# Patient Record
Sex: MALE | Race: BLACK OR AFRICAN AMERICAN
[De-identification: names, ages, dates, MRNs, and addresses within clinical notes are randomized per-mention and may not be internally consistent; named-entity substitution may affect disease eponyms.]

---

## 2023-04-13 ENCOUNTER — APPOINTMENT (OUTPATIENT)
Dept: OPHTHALMOLOGY | Facility: CLINIC | Age: 53
End: 2023-04-13
Payer: COMMERCIAL

## 2023-04-13 ENCOUNTER — NON-APPOINTMENT (OUTPATIENT)
Age: 53
End: 2023-04-13

## 2023-04-13 PROCEDURE — 92004 COMPRE OPH EXAM NEW PT 1/>: CPT

## 2023-04-13 PROCEDURE — 92015 DETERMINE REFRACTIVE STATE: CPT

## 2023-05-03 ENCOUNTER — APPOINTMENT (OUTPATIENT)
Dept: OPHTHALMOLOGY | Facility: CLINIC | Age: 53
End: 2023-05-03

## 2023-06-13 ENCOUNTER — APPOINTMENT (OUTPATIENT)
Dept: OPHTHALMOLOGY | Facility: CLINIC | Age: 53
End: 2023-06-13
Payer: COMMERCIAL

## 2023-06-13 ENCOUNTER — NON-APPOINTMENT (OUTPATIENT)
Age: 53
End: 2023-06-13

## 2023-06-13 PROCEDURE — 92012 INTRM OPH EXAM EST PATIENT: CPT

## 2024-03-18 ENCOUNTER — APPOINTMENT (OUTPATIENT)
Dept: INTERNAL MEDICINE | Facility: CLINIC | Age: 54
End: 2024-03-18
Payer: COMMERCIAL

## 2024-03-18 VITALS
RESPIRATION RATE: 16 BRPM | HEART RATE: 79 BPM | WEIGHT: 195 LBS | HEIGHT: 67 IN | DIASTOLIC BLOOD PRESSURE: 80 MMHG | OXYGEN SATURATION: 99 % | SYSTOLIC BLOOD PRESSURE: 128 MMHG | BODY MASS INDEX: 30.61 KG/M2

## 2024-03-18 DIAGNOSIS — Z91.81 HISTORY OF FALLING: ICD-10-CM

## 2024-03-18 DIAGNOSIS — Z80.9 FAMILY HISTORY OF MALIGNANT NEOPLASM, UNSPECIFIED: ICD-10-CM

## 2024-03-18 DIAGNOSIS — Z00.00 ENCOUNTER FOR GENERAL ADULT MEDICAL EXAMINATION W/OUT ABNORMAL FINDINGS: ICD-10-CM

## 2024-03-18 DIAGNOSIS — Z82.49 FAMILY HISTORY OF ISCHEMIC HEART DISEASE AND OTHER DISEASES OF THE CIRCULATORY SYSTEM: ICD-10-CM

## 2024-03-18 DIAGNOSIS — H04.209 UNSPECIFIED EPIPHORA, UNSPECIFIED SIDE: ICD-10-CM

## 2024-03-18 PROCEDURE — 99386 PREV VISIT NEW AGE 40-64: CPT | Mod: 25

## 2024-03-18 RX ORDER — CHLORHEXIDINE GLUCONATE 4 %
LIQUID (ML) TOPICAL
Refills: 0 | Status: ACTIVE | COMMUNITY

## 2024-03-18 NOTE — HISTORY OF PRESENT ILLNESS
[FreeTextEntry1] : check up. [de-identified] : 54-year-old male is here for checkup.  This is my first time seeing this patient.  Patient denies any fevers, chills, nausea, vomiting, diarrhea, constipation, chest pain, shortness of breath, weakness, numbness, tingling at this time.  Patient reports that he has had 3 COVID vaccines in the past.  Alcohol: Endorses. Patient reports that they drink alcohol occasionally (beer 2 times a week).  Smoking: Denies. Patient reports that they never smoked cigarettes.  Illicit Drugs: Denies. Patient reports that they do not use any recreational drugs.  Colonoscopy: Patient reports that their last colonoscopy was in 2021.  They report that the colonoscopy was normal.  They report that a repeat colonoscopy is due in 10 year in 2031 (Self Reported) Diet: Patient reports that they are good with diet  Exercise: Patient reports that they are good with exercise Living Situation: Family.  Patient reports that they live with wife. Employment Status: Employed. Patient reports that they are employed at this point as a . Education: Reports that the highest level of education is High school. Relationship Status: .  Number of kids : 6 Kids (3 from current wife)  ADLs: Fully functional (bathing, dressing, toileting, transferring, walking, feeding).  Patient reports that they can perform ADLs IADLs: Fully functional and needs no help or supervision to perform IADLs (using the telephone, shopping, preparing meals, housekeeping, doing laundry, using transportation, managing medications and managing finances).  Patient reports that they can perform IADLs.

## 2024-03-18 NOTE — ASSESSMENT
[FreeTextEntry1] : We will do routine blood work in the form of CBC, CMP, A1c, lipid, TSH, B12 folate at this point. he may follow-up earlier if needed Pt was told to call with problems or concerns. The patient was told to seek immediate attention by calling 911 or going to the ER if his condition does not improve or gets acutely worse. He wishes to be screened for STDs. Will check for HIV, HSV, syphilis, gonorrhea, chlamydia, trichomonas, hepatitis at this point.

## 2024-03-18 NOTE — PHYSICAL EXAM
[Normal] : normal rate, regular rhythm, normal S1 and S2 and no murmur heard [No Varicosities] : no varicosities [Pedal Pulses Present] : the pedal pulses are present [No Edema] : there was no peripheral edema [No Extremity Clubbing/Cyanosis] : no extremity clubbing/cyanosis [Soft] : abdomen soft [Non Tender] : non-tender [Non-distended] : non-distended [Normal Bowel Sounds] : normal bowel sounds [Coordination Grossly Intact] : coordination grossly intact [No Focal Deficits] : no focal deficits [de-identified] : 2 cm healing scab noted on the right temporal region

## 2024-03-19 DIAGNOSIS — R71.8 OTHER ABNORMALITY OF RED BLOOD CELLS: ICD-10-CM

## 2024-03-19 DIAGNOSIS — R74.8 ABNORMAL LEVELS OF OTHER SERUM ENZYMES: ICD-10-CM

## 2024-03-19 DIAGNOSIS — D72.9 DISORDER OF WHITE BLOOD CELLS, UNSPECIFIED: ICD-10-CM

## 2024-03-19 LAB
25(OH)D3 SERPL-MCNC: 27.6 NG/ML
ALBUMIN SERPL ELPH-MCNC: 4.6 G/DL
ALP BLD-CCNC: 85 U/L
ALT SERPL-CCNC: 88 U/L
ANION GAP SERPL CALC-SCNC: 13 MMOL/L
AST SERPL-CCNC: 81 U/L
BASOPHILS # BLD AUTO: 0.02 K/UL
BASOPHILS NFR BLD AUTO: 0.5 %
BILIRUB SERPL-MCNC: 0.5 MG/DL
BUN SERPL-MCNC: 12 MG/DL
CALCIUM SERPL-MCNC: 9.9 MG/DL
CHLORIDE SERPL-SCNC: 104 MMOL/L
CHOLEST SERPL-MCNC: 151 MG/DL
CO2 SERPL-SCNC: 25 MMOL/L
CREAT SERPL-MCNC: 0.96 MG/DL
EGFR: 94 ML/MIN/1.73M2
EOSINOPHIL # BLD AUTO: 0.08 K/UL
EOSINOPHIL NFR BLD AUTO: 2 %
ESTIMATED AVERAGE GLUCOSE: 120 MG/DL
FOLATE SERPL-MCNC: 11.2 NG/ML
GLUCOSE SERPL-MCNC: 107 MG/DL
HBA1C MFR BLD HPLC: 5.8 %
HCT VFR BLD CALC: 47.7 %
HDLC SERPL-MCNC: 62 MG/DL
HGB BLD-MCNC: 15.4 G/DL
HIV1+2 AB SPEC QL IA.RAPID: NONREACTIVE
IMM GRANULOCYTES NFR BLD AUTO: 0.2 %
LDLC SERPL CALC-MCNC: 77 MG/DL
LYMPHOCYTES # BLD AUTO: 0.96 K/UL
LYMPHOCYTES NFR BLD AUTO: 23.5 %
MAN DIFF?: NORMAL
MCHC RBC-ENTMCNC: 32.3 GM/DL
MCHC RBC-ENTMCNC: 33 PG
MCV RBC AUTO: 102.1 FL
MONOCYTES # BLD AUTO: 0.46 K/UL
MONOCYTES NFR BLD AUTO: 11.2 %
NEUTROPHILS # BLD AUTO: 2.56 K/UL
NEUTROPHILS NFR BLD AUTO: 62.6 %
NONHDLC SERPL-MCNC: 89 MG/DL
PLATELET # BLD AUTO: 190 K/UL
POTASSIUM SERPL-SCNC: 4.6 MMOL/L
PROT SERPL-MCNC: 7.5 G/DL
PSA FREE FLD-MCNC: 28 %
PSA FREE SERPL-MCNC: 0.13 NG/ML
PSA SERPL-MCNC: 0.46 NG/ML
RBC # BLD: 4.67 M/UL
RBC # FLD: 13.1 %
SODIUM SERPL-SCNC: 141 MMOL/L
TRIGL SERPL-MCNC: 61 MG/DL
TSH SERPL-ACNC: 2.31 UIU/ML
VIT B12 SERPL-MCNC: 967 PG/ML
WBC # FLD AUTO: 4.09 K/UL

## 2024-03-20 LAB
C TRACH RRNA SPEC QL NAA+PROBE: NOT DETECTED
HAV IGM SER QL: NONREACTIVE
HBV CORE IGM SER QL: NONREACTIVE
HBV SURFACE AG SER QL: NONREACTIVE
HCV AB SER QL: NONREACTIVE
HCV S/CO RATIO: 0.1 S/CO
N GONORRHOEA RRNA SPEC QL NAA+PROBE: NOT DETECTED
SOURCE AMPLIFICATION: NORMAL
SOURCE AMPLIFICATION: NORMAL
T PALLIDUM AB SER QL IA: NEGATIVE

## 2024-03-21 ENCOUNTER — NON-APPOINTMENT (OUTPATIENT)
Age: 54
End: 2024-03-21

## 2024-03-21 LAB
HSV 1 IGG TYPE-SPECIFIC AB: 36.5 INDEX
HSV 2 IGG TYPE-SPECIFIC AB: <0.9 INDEX
SOURCE AMPLIFICATION: NORMAL
T VAGINALIS RRNA SPEC QL NAA+PROBE: NOT DETECTED

## 2024-03-23 ENCOUNTER — OUTPATIENT (OUTPATIENT)
Dept: OUTPATIENT SERVICES | Facility: HOSPITAL | Age: 54
LOS: 1 days | End: 2024-03-23
Payer: COMMERCIAL

## 2024-03-23 ENCOUNTER — APPOINTMENT (OUTPATIENT)
Dept: CT IMAGING | Facility: IMAGING CENTER | Age: 54
End: 2024-03-23
Payer: COMMERCIAL

## 2024-03-23 DIAGNOSIS — Z91.81 HISTORY OF FALLING: ICD-10-CM

## 2024-03-23 PROCEDURE — 70450 CT HEAD/BRAIN W/O DYE: CPT

## 2024-03-23 PROCEDURE — 70450 CT HEAD/BRAIN W/O DYE: CPT | Mod: 26

## 2024-05-15 ENCOUNTER — OUTPATIENT (OUTPATIENT)
Dept: OUTPATIENT SERVICES | Facility: HOSPITAL | Age: 54
LOS: 1 days | Discharge: HOME | End: 2024-05-15

## 2024-05-15 DIAGNOSIS — D72.119 HYPEREOSINOPHILIC SYNDROME [HES], UNSPECIFIED: ICD-10-CM

## 2024-05-21 ENCOUNTER — RESULT REVIEW (OUTPATIENT)
Age: 54
End: 2024-05-21

## 2024-05-21 ENCOUNTER — APPOINTMENT (OUTPATIENT)
Dept: HEMATOLOGY ONCOLOGY | Facility: CLINIC | Age: 54
End: 2024-05-21
Payer: COMMERCIAL

## 2024-05-21 VITALS
OXYGEN SATURATION: 99 % | RESPIRATION RATE: 17 BRPM | BODY MASS INDEX: 29.93 KG/M2 | WEIGHT: 190.7 LBS | HEART RATE: 83 BPM | SYSTOLIC BLOOD PRESSURE: 129 MMHG | TEMPERATURE: 98.5 F | DIASTOLIC BLOOD PRESSURE: 89 MMHG | HEIGHT: 66.93 IN

## 2024-05-21 DIAGNOSIS — Z78.9 OTHER SPECIFIED HEALTH STATUS: ICD-10-CM

## 2024-05-21 LAB
BASOPHILS # BLD AUTO: 0.01 K/UL — SIGNIFICANT CHANGE UP (ref 0–0.2)
BASOPHILS NFR BLD AUTO: 0.2 % — SIGNIFICANT CHANGE UP (ref 0–2)
EOSINOPHIL # BLD AUTO: 0.02 K/UL — SIGNIFICANT CHANGE UP (ref 0–0.5)
EOSINOPHIL NFR BLD AUTO: 0.4 % — SIGNIFICANT CHANGE UP (ref 0–6)
ERYTHROCYTE [SEDIMENTATION RATE] IN BLOOD: 4 MM/HR — SIGNIFICANT CHANGE UP (ref 0–20)
HCT VFR BLD CALC: 46.4 % — SIGNIFICANT CHANGE UP (ref 39–50)
HGB BLD-MCNC: 15.6 G/DL — SIGNIFICANT CHANGE UP (ref 13–17)
IMM GRANULOCYTES NFR BLD AUTO: 0.2 % — SIGNIFICANT CHANGE UP (ref 0–0.9)
LYMPHOCYTES # BLD AUTO: 0.86 K/UL — LOW (ref 1–3.3)
LYMPHOCYTES # BLD AUTO: 16.6 % — SIGNIFICANT CHANGE UP (ref 13–44)
MCHC RBC-ENTMCNC: 33.4 PG — SIGNIFICANT CHANGE UP (ref 27–34)
MCHC RBC-ENTMCNC: 33.6 G/DL — SIGNIFICANT CHANGE UP (ref 32–36)
MCV RBC AUTO: 99.4 FL — SIGNIFICANT CHANGE UP (ref 80–100)
MONOCYTES # BLD AUTO: 0.49 K/UL — SIGNIFICANT CHANGE UP (ref 0–0.9)
MONOCYTES NFR BLD AUTO: 9.5 % — SIGNIFICANT CHANGE UP (ref 2–14)
NEUTROPHILS # BLD AUTO: 3.78 K/UL — SIGNIFICANT CHANGE UP (ref 1.8–7.4)
NEUTROPHILS NFR BLD AUTO: 73.1 % — SIGNIFICANT CHANGE UP (ref 43–77)
NRBC # BLD: 0 /100 WBCS — SIGNIFICANT CHANGE UP (ref 0–0)
PLATELET # BLD AUTO: 178 K/UL — SIGNIFICANT CHANGE UP (ref 150–400)
RBC # BLD: 4.67 M/UL — SIGNIFICANT CHANGE UP (ref 4.2–5.8)
RBC # FLD: 12.5 % — SIGNIFICANT CHANGE UP (ref 10.3–14.5)
RETICS #: 63.5 K/UL — SIGNIFICANT CHANGE UP (ref 25–125)
RETICS/RBC NFR: 1.4 % — SIGNIFICANT CHANGE UP (ref 0.5–2.5)
WBC # BLD: 5.17 K/UL — SIGNIFICANT CHANGE UP (ref 3.8–10.5)
WBC # FLD AUTO: 5.17 K/UL — SIGNIFICANT CHANGE UP (ref 3.8–10.5)

## 2024-05-21 PROCEDURE — 99204 OFFICE O/P NEW MOD 45 MIN: CPT

## 2024-05-29 ENCOUNTER — NON-APPOINTMENT (OUTPATIENT)
Age: 54
End: 2024-05-29

## 2024-05-29 LAB
ALBUMIN MFR SERPL ELPH: 55 %
ALBUMIN SERPL ELPH-MCNC: 4.9 G/DL
ALBUMIN SERPL-MCNC: 4.3 G/DL
ALBUMIN/GLOB SERPL: 1.2 RATIO
ALP BLD-CCNC: 78 U/L
ALPHA1 GLOB MFR SERPL ELPH: 3.7 %
ALPHA1 GLOB SERPL ELPH-MCNC: 0.3 G/DL
ALPHA2 GLOB MFR SERPL ELPH: 7.8 %
ALPHA2 GLOB SERPL ELPH-MCNC: 0.6 G/DL
ALT SERPL-CCNC: 51 U/L
ANION GAP SERPL CALC-SCNC: 15 MMOL/L
AST SERPL-CCNC: 57 U/L
B-GLOBULIN MFR SERPL ELPH: 12.9 %
B-GLOBULIN SERPL ELPH-MCNC: 1 G/DL
BILIRUB SERPL-MCNC: 0.7 MG/DL
BUN SERPL-MCNC: 13 MG/DL
CALCIUM SERPL-MCNC: 9.8 MG/DL
CHLORIDE SERPL-SCNC: 101 MMOL/L
CO2 SERPL-SCNC: 24 MMOL/L
COPPER SERPL-MCNC: 121 UG/DL
CREAT SERPL-MCNC: 1.04 MG/DL
CRP SERPL-MCNC: 3 MG/L
DEPRECATED KAPPA LC FREE/LAMBDA SER: 1.1 RATIO
EGFR: 85 ML/MIN/1.73M2
FOLATE SERPL-MCNC: 11.4 NG/ML
GAMMA GLOB FLD ELPH-MCNC: 1.6 G/DL
GAMMA GLOB MFR SERPL ELPH: 20.6 %
GLUCOSE SERPL-MCNC: 85 MG/DL
HAPTOGLOB SERPL-MCNC: 148 MG/DL
IGA SER QL IEP: 283 MG/DL
IGG SER QL IEP: 1405 MG/DL
IGM SER QL IEP: 139 MG/DL
INTERPRETATION SERPL IEP-IMP: NORMAL
KAPPA LC CSF-MCNC: 2.72 MG/DL
KAPPA LC SERPL-MCNC: 2.98 MG/DL
LDH SERPL-CCNC: 181 U/L
M PROTEIN SPEC IFE-MCNC: NORMAL
POTASSIUM SERPL-SCNC: 4.8 MMOL/L
PROT SERPL-MCNC: 7.7 G/DL
PROT SERPL-MCNC: 7.8 G/DL
PROT SERPL-MCNC: 7.8 G/DL
SODIUM SERPL-SCNC: 139 MMOL/L
VIT B12 SERPL-MCNC: 751 PG/ML

## 2024-05-30 ENCOUNTER — APPOINTMENT (OUTPATIENT)
Dept: HEMATOLOGY ONCOLOGY | Facility: CLINIC | Age: 54
End: 2024-05-30
Payer: COMMERCIAL

## 2024-05-30 DIAGNOSIS — D75.89 OTHER SPECIFIED DISEASES OF BLOOD AND BLOOD-FORMING ORGANS: ICD-10-CM

## 2024-05-30 DIAGNOSIS — D72.810 LYMPHOCYTOPENIA: ICD-10-CM

## 2024-05-30 PROCEDURE — 99442: CPT | Mod: 93

## 2024-06-08 PROBLEM — Z78.9 SOCIAL ALCOHOL USE: Status: ACTIVE | Noted: 2024-06-08

## 2024-06-08 RX ORDER — CHOLECALCIFEROL (VITAMIN D3) 25 MCG
TABLET ORAL
Refills: 0 | Status: ACTIVE | COMMUNITY

## 2024-06-08 NOTE — REASON FOR VISIT
[Initial Consultation] : an initial consultation for [FreeTextEntry2] : macrocytosis, lymphocytopenia

## 2024-06-08 NOTE — REVIEW OF SYSTEMS
[Recent Change In Weight] : ~T recent weight change [Diarrhea: Grade 0] : Diarrhea: Grade 0 [Negative] : Allergic/Immunologic [Palpitations] : palpitations [FreeTextEntry4] : occasional gum bleeding  [FreeTextEntry5] : occasional palpitations

## 2024-06-08 NOTE — PHYSICAL EXAM
[Normal] : normal appearance, no rash, nodules, vesicles, ulcers, erythema [de-identified] : No cervical, axillary or inguinal LAD noted

## 2024-06-08 NOTE — ASSESSMENT
[FreeTextEntry1] : 54-year-old man with no significant past medical history here for evaluation of macrocytosis and lymphocytopenia.    Recent CBC on 3/18/2024 showed WBC 4.09, HGB 15.4, HCT 47.7, .1 and ,000.  Differential showed normal % lymphocytes at 23.5 with ALC of 0.96.  Recent B12 967 and Folate 11.2.  Hepatitis B/C and HIV are all non-reactive.  TSH was 2.31.  Patient reports that he was vacationing in [place] for two weeks in 3/2024 and had increased alcohol consumption at that time.    Plan: Will check CBC, CMP, B12, Folate, Copper level, plasma cell screen, LDH, haptoglobin, reticulocyte count, ESR/CRP and flow cytometry.  Patient advised to follow-up with telehealth visit in two weeks to discuss all laboratory results.

## 2024-06-08 NOTE — HISTORY OF PRESENT ILLNESS
[de-identified] : Initial Consultation on 2024 Referred by: Dr. Ramirez Navarrete CC: macrocytosis, lymphocytopenia   HPI: 54-year-old man with no significant past medical history here for evaluation of macrocytosis and lymphocytopenia.    Recent CBC on 3/18/2024 showed WBC 4.09, HGB 15.4, HCT 47.7, .1 and ,000.  Differential showed normal % lymphocytes at 23.5 with ALC of 0.96.  Recent B12 967 and Folate 11.2.  Hepatitis B/C and HIV are all non-reactive.  TSH was 2.31.  Patient reports that he was vacationing in [place] for two weeks in 3/2024 prior to the lab draw and had increased alcohol consumption at that time.     He feels good overall.  He reports a 4-pound deliberate weight loss.  He c/o occasional palpitations.  He c/o occasional gum bleeding; he plans to see his dentist soon for evaluation. Patient denies any fever/chills, recent infections, CP, SOB, abdominal pain, n/v/d, bloody/black stools, frequent headaches, dizziness, night sweats, swollen glands, rashes or joint pain/swelling.    Hospitalizations: Denies   Medications: See List.  Medications reconciled Also takes Vitamin D3 intermittently and Multivitamin daily.    Allergies: NKDA   Social History: . Has three children.  Works as a retired .  Works as an  for cargo/shipping.  Denies tobacco smoking.  Occasional marijuana use.  Alcohol use- 6 pack of beer and 2-3 shots weekly.  [place] and father was born in [place].  Eats a regular diet without restrictions.    Family History: Mother , unknown cancer at age 59   Healthcare Maintenance: Primary care doctor- Dr. Navarrete- last seen two months ago.  Last colonoscopy- 3 years ago, normal Denies endoscopy.   [de-identified] : Initial Visit

## 2024-06-09 PROBLEM — D72.810 ACQUIRED LYMPHOCYTOPENIA: Status: ACTIVE | Noted: 2024-05-21

## 2024-06-09 PROBLEM — D75.89 MACROCYTOSIS: Status: ACTIVE | Noted: 2024-05-21

## 2024-06-09 NOTE — HISTORY OF PRESENT ILLNESS
[de-identified] : Initial Consultation on 2024 Referred by: Dr. Ramirez Navarrete CC: macrocytosis, lymphocytopenia   HPI: 54-year-old man with no significant past medical history here for evaluation of macrocytosis and lymphocytopenia.    Recent CBC on 3/18/2024 showed WBC 4.09, HGB 15.4, HCT 47.7, .1 and ,000.  Differential showed normal % lymphocytes at 23.5 with ALC of 0.96.  Recent B12 967 and Folate 11.2.  Hepatitis B/C and HIV are all non-reactive.  TSH was 2.31.  Patient reports that he was vacationing in [place] for two weeks in 3/2024 prior to the lab draw and had increased alcohol consumption at that time.     He feels good overall.  He reports a 4-pound deliberate weight loss.  He c/o occasional palpitations.  He c/o occasional gum bleeding; he plans to see his dentist soon for evaluation. Patient denies any fever/chills, recent infections, CP, SOB, abdominal pain, n/v/d, bloody/black stools, frequent headaches, dizziness, night sweats, swollen glands, rashes or joint pain/swelling.    Hospitalizations: Denies   Medications: See List.  Medications reconciled Also takes Vitamin D3 intermittently and Multivitamin daily.    Allergies: NKDA   Social History: . Has three children.  Works as a retired .  Works as an  for cargo/shipping.  Denies tobacco smoking.  Occasional marijuana use.  Alcohol use- 6 pack of beer and 2-3 shots weekly.  [place] and father was born in [place].  Eats a regular diet without restrictions.    Family History: Mother , unknown cancer at age 59   Healthcare Maintenance: Primary care doctor- Dr. Navarrete- last seen two months ago.  Last colonoscopy- 3 years ago, normal Denies endoscopy.   [de-identified] : Follow-up Visit by telephone on 5/30/2024 to discuss laboratory findings.  Patient seen for macrocytosis and lymphocytopenia.  CBC showed WBC 5.17, HGB 15.6, HCT 46.4, MCV 99.4, ,000.  Lymphocyte % normal at 16.6 with decreased ALC of 0.86.  CMP showed AST 57, ALT 51, otherwise normal.  B12 was 751, Folate 11.4 Copper level 121 normal Serum protein electrophoresis showed no monoclonal band.  No evidence of hemolysis ESR 4 CRP 3 Flow cytometry normal Kappa/Lambda FLC ratio 1.10  20.9

## 2024-06-09 NOTE — ASSESSMENT
[FreeTextEntry1] : 54-year-old man with no significant past medical history here for evaluation of macrocytosis and lymphocytopenia.    Macrocytosis resolved. Work-up normal.  ALC still decreased without known etiology.   Plan: Recommend follow-up in 6 months to repeat CBC with differential.  Follow-up with PMD for transaminitis.  Tasked Pamela to schedule an appt.  All questions answered.

## 2024-06-09 NOTE — REVIEW OF SYSTEMS
[Recent Change In Weight] : ~T recent weight change [Palpitations] : palpitations [Diarrhea: Grade 0] : Diarrhea: Grade 0 [Negative] : Allergic/Immunologic [FreeTextEntry4] : occasional gum bleeding  [FreeTextEntry5] : occasional palpitations

## 2024-06-13 ENCOUNTER — APPOINTMENT (OUTPATIENT)
Dept: INTERNAL MEDICINE | Facility: CLINIC | Age: 54
End: 2024-06-13

## 2024-09-23 ENCOUNTER — APPOINTMENT (OUTPATIENT)
Dept: OPHTHALMOLOGY | Facility: CLINIC | Age: 54
End: 2024-09-23
Payer: COMMERCIAL

## 2024-09-23 ENCOUNTER — NON-APPOINTMENT (OUTPATIENT)
Age: 54
End: 2024-09-23

## 2024-09-23 PROCEDURE — 92012 INTRM OPH EXAM EST PATIENT: CPT

## 2024-11-14 ENCOUNTER — OUTPATIENT (OUTPATIENT)
Dept: OUTPATIENT SERVICES | Facility: HOSPITAL | Age: 54
LOS: 1 days | Discharge: HOME | End: 2024-11-14

## 2024-11-14 DIAGNOSIS — D72.119 HYPEREOSINOPHILIC SYNDROME [HES], UNSPECIFIED: ICD-10-CM

## 2024-11-20 ENCOUNTER — APPOINTMENT (OUTPATIENT)
Dept: HEMATOLOGY ONCOLOGY | Facility: CLINIC | Age: 54
End: 2024-11-20

## 2024-11-20 ENCOUNTER — RESULT REVIEW (OUTPATIENT)
Age: 54
End: 2024-11-20

## 2024-11-20 VITALS
BODY MASS INDEX: 31.19 KG/M2 | OXYGEN SATURATION: 100 % | HEART RATE: 83 BPM | WEIGHT: 198.72 LBS | TEMPERATURE: 98.4 F | RESPIRATION RATE: 17 BRPM | DIASTOLIC BLOOD PRESSURE: 95 MMHG | SYSTOLIC BLOOD PRESSURE: 145 MMHG

## 2024-11-20 LAB
ALBUMIN SERPL ELPH-MCNC: 4.5 G/DL
ALP BLD-CCNC: 82 U/L
ALT SERPL-CCNC: 47 U/L
ANION GAP SERPL CALC-SCNC: 12 MMOL/L
AST SERPL-CCNC: 47 U/L
BASOPHILS # BLD AUTO: 0.01 K/UL — SIGNIFICANT CHANGE UP (ref 0–0.2)
BASOPHILS NFR BLD AUTO: 0.2 % — SIGNIFICANT CHANGE UP (ref 0–2)
BILIRUB SERPL-MCNC: 0.6 MG/DL
BUN SERPL-MCNC: 12 MG/DL
CALCIUM SERPL-MCNC: 10.5 MG/DL
CD16+CD56+ CELLS # BLD: 127 CELLS/UL
CD16+CD56+ CELLS NFR BLD: 12 %
CD19 CELLS NFR BLD: 28 CELLS/UL
CD3 CELLS # BLD: 892 CELLS/UL
CD3 CELLS NFR BLD: 83 %
CD3+CD4+ CELLS # BLD: 625 CELLS/UL
CD3+CD4+ CELLS NFR BLD: 57 %
CD3+CD4+ CELLS/CD3+CD8+ CLL SPEC: 2.26 RATIO
CD3+CD8+ CELLS # SPEC: 277 CELLS/UL
CD3+CD8+ CELLS NFR BLD: 25 %
CELLS.CD3-CD19+/CELLS IN BLOOD: 3 %
CHLORIDE SERPL-SCNC: 99 MMOL/L
CO2 SERPL-SCNC: 25 MMOL/L
CREAT SERPL-MCNC: 1.05 MG/DL
DEPRECATED KAPPA LC FREE/LAMBDA SER: 1.2 RATIO
EGFR: 84 ML/MIN/1.73M2
EOSINOPHIL # BLD AUTO: 0.11 K/UL — SIGNIFICANT CHANGE UP (ref 0–0.5)
EOSINOPHIL NFR BLD AUTO: 2.1 % — SIGNIFICANT CHANGE UP (ref 0–6)
FOLATE SERPL-MCNC: 11 NG/ML
GLUCOSE SERPL-MCNC: 106 MG/DL
HCT VFR BLD CALC: 46.4 % — SIGNIFICANT CHANGE UP (ref 39–50)
HCYS SERPL-MCNC: 21.1 UMOL/L
HGB BLD-MCNC: 15.6 G/DL — SIGNIFICANT CHANGE UP (ref 13–17)
IGA SER QL IEP: 279 MG/DL
IGG SER QL IEP: 1428 MG/DL
IGM SER QL IEP: 145 MG/DL
IMM GRANULOCYTES NFR BLD AUTO: 0.4 % — SIGNIFICANT CHANGE UP (ref 0–0.9)
KAPPA LC CSF-MCNC: 2.65 MG/DL
KAPPA LC SERPL-MCNC: 3.17 MG/DL
LYMPHOCYTES # BLD AUTO: 1.08 K/UL — SIGNIFICANT CHANGE UP (ref 1–3.3)
LYMPHOCYTES # BLD AUTO: 20.8 % — SIGNIFICANT CHANGE UP (ref 13–44)
MCHC RBC-ENTMCNC: 33.6 G/DL — SIGNIFICANT CHANGE UP (ref 32–36)
MCHC RBC-ENTMCNC: 33.9 PG — SIGNIFICANT CHANGE UP (ref 27–34)
MCV RBC AUTO: 100.9 FL — HIGH (ref 80–100)
MONOCYTES # BLD AUTO: 0.53 K/UL — SIGNIFICANT CHANGE UP (ref 0–0.9)
MONOCYTES NFR BLD AUTO: 10.2 % — SIGNIFICANT CHANGE UP (ref 2–14)
NEUTROPHILS # BLD AUTO: 3.44 K/UL — SIGNIFICANT CHANGE UP (ref 1.8–7.4)
NEUTROPHILS NFR BLD AUTO: 66.3 % — SIGNIFICANT CHANGE UP (ref 43–77)
NRBC # BLD: 0 /100 WBCS — SIGNIFICANT CHANGE UP (ref 0–0)
NRBC BLD-RTO: 0 /100 WBCS — SIGNIFICANT CHANGE UP (ref 0–0)
PLATELET # BLD AUTO: 182 K/UL — SIGNIFICANT CHANGE UP (ref 150–400)
POTASSIUM SERPL-SCNC: 4.8 MMOL/L
PROT SERPL-MCNC: 7.7 G/DL
RBC # BLD: 4.6 M/UL — SIGNIFICANT CHANGE UP (ref 4.2–5.8)
RBC # FLD: 12.5 % — SIGNIFICANT CHANGE UP (ref 10.3–14.5)
SODIUM SERPL-SCNC: 136 MMOL/L
VIT B12 SERPL-MCNC: 846 PG/ML
WBC # BLD: 5.19 K/UL — SIGNIFICANT CHANGE UP (ref 3.8–10.5)
WBC # FLD AUTO: 5.19 K/UL — SIGNIFICANT CHANGE UP (ref 3.8–10.5)

## 2024-11-20 PROCEDURE — 99213 OFFICE O/P EST LOW 20 MIN: CPT

## 2024-11-25 LAB — METHYLMALONATE SERPL-SCNC: 110 NMOL/L

## 2024-12-10 ENCOUNTER — NON-APPOINTMENT (OUTPATIENT)
Age: 54
End: 2024-12-10

## 2025-01-24 ENCOUNTER — APPOINTMENT (OUTPATIENT)
Dept: INTERNAL MEDICINE | Facility: CLINIC | Age: 55
End: 2025-01-24
Payer: COMMERCIAL

## 2025-01-24 VITALS
TEMPERATURE: 97.6 F | DIASTOLIC BLOOD PRESSURE: 78 MMHG | HEART RATE: 75 BPM | RESPIRATION RATE: 16 BRPM | SYSTOLIC BLOOD PRESSURE: 122 MMHG | BODY MASS INDEX: 30.45 KG/M2 | WEIGHT: 194 LBS | OXYGEN SATURATION: 98 % | HEIGHT: 66.93 IN

## 2025-01-24 VITALS — SYSTOLIC BLOOD PRESSURE: 118 MMHG | DIASTOLIC BLOOD PRESSURE: 82 MMHG

## 2025-01-24 DIAGNOSIS — R71.8 OTHER ABNORMALITY OF RED BLOOD CELLS: ICD-10-CM

## 2025-01-24 DIAGNOSIS — H04.209 UNSPECIFIED EPIPHORA, UNSPECIFIED SIDE: ICD-10-CM

## 2025-01-24 DIAGNOSIS — Z91.81 HISTORY OF FALLING: ICD-10-CM

## 2025-01-24 DIAGNOSIS — Z00.00 ENCOUNTER FOR GENERAL ADULT MEDICAL EXAMINATION W/OUT ABNORMAL FINDINGS: ICD-10-CM

## 2025-01-24 DIAGNOSIS — R74.8 ABNORMAL LEVELS OF OTHER SERUM ENZYMES: ICD-10-CM

## 2025-01-24 DIAGNOSIS — R73.03 PREDIABETES.: ICD-10-CM

## 2025-01-24 DIAGNOSIS — B00.9 HERPESVIRAL INFECTION, UNSPECIFIED: ICD-10-CM

## 2025-01-24 DIAGNOSIS — D72.9 DISORDER OF WHITE BLOOD CELLS, UNSPECIFIED: ICD-10-CM

## 2025-01-24 DIAGNOSIS — R79.89 OTHER SPECIFIED ABNORMAL FINDINGS OF BLOOD CHEMISTRY: ICD-10-CM

## 2025-01-24 PROCEDURE — 99396 PREV VISIT EST AGE 40-64: CPT

## 2025-01-27 LAB
25(OH)D3 SERPL-MCNC: 25 NG/ML
ALBUMIN SERPL ELPH-MCNC: 4.3 G/DL
ALP BLD-CCNC: 70 U/L
ALT SERPL-CCNC: 46 U/L
ANION GAP SERPL CALC-SCNC: 12 MMOL/L
AST SERPL-CCNC: 38 U/L
BASOPHILS # BLD AUTO: 0.01 K/UL
BASOPHILS NFR BLD AUTO: 0.3 %
BILIRUB SERPL-MCNC: 0.6 MG/DL
BUN SERPL-MCNC: 7 MG/DL
CALCIUM SERPL-MCNC: 9.3 MG/DL
CHLORIDE SERPL-SCNC: 102 MMOL/L
CHOLEST SERPL-MCNC: 149 MG/DL
CO2 SERPL-SCNC: 24 MMOL/L
CREAT SERPL-MCNC: 0.97 MG/DL
EGFR: 93 ML/MIN/1.73M2
EOSINOPHIL # BLD AUTO: 0.1 K/UL
EOSINOPHIL NFR BLD AUTO: 2.6 %
ESTIMATED AVERAGE GLUCOSE: 120 MG/DL
FOLATE SERPL-MCNC: 8.3 NG/ML
GLUCOSE SERPL-MCNC: 94 MG/DL
HAV IGM SER QL: NONREACTIVE
HBA1C MFR BLD HPLC: 5.8 %
HBV CORE IGM SER QL: NONREACTIVE
HBV SURFACE AG SER QL: NONREACTIVE
HCT VFR BLD CALC: 44.5 %
HCV AB SER QL: NONREACTIVE
HCV S/CO RATIO: 0.1 S/CO
HDLC SERPL-MCNC: 53 MG/DL
HGB BLD-MCNC: 14.6 G/DL
IMM GRANULOCYTES NFR BLD AUTO: 0.5 %
LDLC SERPL CALC-MCNC: 79 MG/DL
LYMPHOCYTES # BLD AUTO: 0.82 K/UL
LYMPHOCYTES NFR BLD AUTO: 21.2 %
MAN DIFF?: NORMAL
MCHC RBC-ENTMCNC: 32.8 G/DL
MCHC RBC-ENTMCNC: 33.3 PG
MCV RBC AUTO: 101.6 FL
MONOCYTES # BLD AUTO: 0.58 K/UL
MONOCYTES NFR BLD AUTO: 15 %
NEUTROPHILS # BLD AUTO: 2.33 K/UL
NEUTROPHILS NFR BLD AUTO: 60.4 %
NONHDLC SERPL-MCNC: 96 MG/DL
PLATELET # BLD AUTO: 203 K/UL
POTASSIUM SERPL-SCNC: 3.9 MMOL/L
PROT SERPL-MCNC: 7 G/DL
PSA FREE FLD-MCNC: 25 %
PSA FREE SERPL-MCNC: 0.15 NG/ML
PSA SERPL-MCNC: 0.62 NG/ML
RBC # BLD: 4.38 M/UL
RBC # FLD: 12.6 %
SODIUM SERPL-SCNC: 139 MMOL/L
TRIGL SERPL-MCNC: 88 MG/DL
TSH SERPL-ACNC: 3.42 UIU/ML
VIT B12 SERPL-MCNC: 1018 PG/ML
WBC # FLD AUTO: 3.86 K/UL

## 2025-02-16 ENCOUNTER — OUTPATIENT (OUTPATIENT)
Dept: OUTPATIENT SERVICES | Facility: HOSPITAL | Age: 55
LOS: 1 days | End: 2025-02-16
Payer: COMMERCIAL

## 2025-02-16 DIAGNOSIS — R74.8 ABNORMAL LEVELS OF OTHER SERUM ENZYMES: ICD-10-CM

## 2025-02-16 DIAGNOSIS — Z00.00 ENCOUNTER FOR GENERAL ADULT MEDICAL EXAMINATION WITHOUT ABNORMAL FINDINGS: ICD-10-CM

## 2025-02-16 PROCEDURE — 76705 ECHO EXAM OF ABDOMEN: CPT

## 2025-02-19 DIAGNOSIS — K76.0 FATTY (CHANGE OF) LIVER, NOT ELSEWHERE CLASSIFIED: ICD-10-CM

## 2025-04-07 ENCOUNTER — APPOINTMENT (OUTPATIENT)
Dept: INTERNAL MEDICINE | Facility: CLINIC | Age: 55
End: 2025-04-07
Payer: COMMERCIAL

## 2025-04-07 VITALS
WEIGHT: 188 LBS | HEIGHT: 66.93 IN | HEART RATE: 78 BPM | DIASTOLIC BLOOD PRESSURE: 78 MMHG | BODY MASS INDEX: 29.51 KG/M2 | RESPIRATION RATE: 16 BRPM | SYSTOLIC BLOOD PRESSURE: 122 MMHG | OXYGEN SATURATION: 98 %

## 2025-04-07 DIAGNOSIS — J06.9 ACUTE UPPER RESPIRATORY INFECTION, UNSPECIFIED: ICD-10-CM

## 2025-04-07 DIAGNOSIS — Z13.9 ENCOUNTER FOR SCREENING, UNSPECIFIED: ICD-10-CM

## 2025-04-07 PROCEDURE — G2211 COMPLEX E/M VISIT ADD ON: CPT

## 2025-04-07 PROCEDURE — 99213 OFFICE O/P EST LOW 20 MIN: CPT

## 2025-04-07 RX ORDER — FLUTICASONE PROPIONATE 50 UG/1
50 SPRAY, METERED NASAL
Qty: 1 | Refills: 0 | Status: ACTIVE | COMMUNITY
Start: 2025-04-07 | End: 1900-01-01

## 2025-04-07 RX ORDER — GUAIFENESIN 200 MG 200 MG/1
200 TABLET ORAL
Qty: 15 | Refills: 0 | Status: ACTIVE | COMMUNITY
Start: 2025-04-07 | End: 1900-01-01

## 2025-04-07 RX ORDER — FEXOFENADINE HCL 60 MG/1
60 TABLET, FILM COATED ORAL
Qty: 10 | Refills: 0 | Status: ACTIVE | COMMUNITY
Start: 2025-04-07 | End: 1900-01-01